# Patient Record
Sex: FEMALE | Race: WHITE | NOT HISPANIC OR LATINO
[De-identification: names, ages, dates, MRNs, and addresses within clinical notes are randomized per-mention and may not be internally consistent; named-entity substitution may affect disease eponyms.]

---

## 2017-10-29 ENCOUNTER — RESULT REVIEW (OUTPATIENT)
Age: 67
End: 2017-10-29

## 2019-01-03 PROBLEM — Z00.00 ENCOUNTER FOR PREVENTIVE HEALTH EXAMINATION: Status: ACTIVE | Noted: 2019-01-03

## 2019-01-11 ENCOUNTER — RECORD ABSTRACTING (OUTPATIENT)
Age: 69
End: 2019-01-11

## 2019-01-11 DIAGNOSIS — Z83.3 FAMILY HISTORY OF DIABETES MELLITUS: ICD-10-CM

## 2019-01-11 RX ORDER — ROSUVASTATIN CALCIUM 5 MG/1
5 TABLET, FILM COATED ORAL
Refills: 0 | Status: ACTIVE | COMMUNITY

## 2019-01-14 ENCOUNTER — APPOINTMENT (OUTPATIENT)
Dept: INTERNAL MEDICINE | Facility: CLINIC | Age: 69
End: 2019-01-14
Payer: MEDICARE

## 2019-01-14 VITALS
DIASTOLIC BLOOD PRESSURE: 85 MMHG | BODY MASS INDEX: 34.15 KG/M2 | SYSTOLIC BLOOD PRESSURE: 147 MMHG | WEIGHT: 200 LBS | HEART RATE: 83 BPM | HEIGHT: 64 IN

## 2019-01-14 VITALS — SYSTOLIC BLOOD PRESSURE: 128 MMHG | DIASTOLIC BLOOD PRESSURE: 78 MMHG

## 2019-01-14 PROCEDURE — 99214 OFFICE O/P EST MOD 30 MIN: CPT

## 2019-01-14 NOTE — HISTORY OF PRESENT ILLNESS
[FreeTextEntry1] : f/u  htn hyperlipidemia  she remains on losartan metformin crestor and recently invokana added  she  denies headaches  dizziness   and cp and sob  she was feeling  very tired before she saw endo and a1c was over  7  she is eating well  she walks  to work  for about 10 minutes at  times briskly

## 2019-01-14 NOTE — PHYSICAL EXAM
[No Acute Distress] : no acute distress [Well Nourished] : well nourished [Well Developed] : well developed [Well-Appearing] : well-appearing [Normal Sclera/Conjunctiva] : normal sclera/conjunctiva [Normal Outer Ear/Nose] : the outer ears and nose were normal in appearance [No JVD] : no jugular venous distention [No Respiratory Distress] : no respiratory distress  [Clear to Auscultation] : lungs were clear to auscultation bilaterally [No Accessory Muscle Use] : no accessory muscle use [Normal Percussion] : the chest was normal to percussion [Normal Rate] : normal rate  [Regular Rhythm] : with a regular rhythm [Normal S1, S2] : normal S1 and S2 [No Carotid Bruits] : no carotid bruits [Soft] : abdomen soft [Non Tender] : non-tender [Non-distended] : non-distended [No HSM] : no HSM [Normal Bowel Sounds] : normal bowel sounds [No Joint Swelling] : no joint swelling [Normal Gait] : normal gait [Coordination Grossly Intact] : coordination grossly intact [Speech Grossly Normal] : speech grossly normal [Memory Grossly Normal] : memory grossly normal [Normal Affect] : the affect was normal [Normal Mood] : the mood was normal

## 2019-01-24 ENCOUNTER — RESULT REVIEW (OUTPATIENT)
Age: 69
End: 2019-01-24

## 2019-06-10 ENCOUNTER — MEDICATION RENEWAL (OUTPATIENT)
Age: 69
End: 2019-06-10

## 2019-11-18 ENCOUNTER — APPOINTMENT (OUTPATIENT)
Dept: INTERNAL MEDICINE | Facility: CLINIC | Age: 69
End: 2019-11-18
Payer: MEDICARE

## 2019-11-18 ENCOUNTER — RECORD ABSTRACTING (OUTPATIENT)
Age: 69
End: 2019-11-18

## 2019-11-18 VITALS — SYSTOLIC BLOOD PRESSURE: 110 MMHG | DIASTOLIC BLOOD PRESSURE: 75 MMHG

## 2019-11-18 VITALS
BODY MASS INDEX: 32.1 KG/M2 | HEART RATE: 71 BPM | HEIGHT: 64 IN | SYSTOLIC BLOOD PRESSURE: 138 MMHG | DIASTOLIC BLOOD PRESSURE: 74 MMHG | WEIGHT: 188 LBS

## 2019-11-18 DIAGNOSIS — R07.9 CHEST PAIN, UNSPECIFIED: ICD-10-CM

## 2019-11-18 DIAGNOSIS — I10 ESSENTIAL (PRIMARY) HYPERTENSION: ICD-10-CM

## 2019-11-18 DIAGNOSIS — H61.23 IMPACTED CERUMEN, BILATERAL: ICD-10-CM

## 2019-11-18 PROCEDURE — 99214 OFFICE O/P EST MOD 30 MIN: CPT | Mod: 25

## 2019-11-18 PROCEDURE — 90732 PPSV23 VACC 2 YRS+ SUBQ/IM: CPT

## 2019-11-18 PROCEDURE — G0009: CPT

## 2019-11-18 PROCEDURE — 69210 REMOVE IMPACTED EAR WAX UNI: CPT

## 2019-11-18 RX ORDER — METFORMIN HYDROCHLORIDE 500 MG/1
500 TABLET, COATED ORAL
Qty: 180 | Refills: 0 | Status: ACTIVE | COMMUNITY
Start: 2019-11-18

## 2019-11-18 NOTE — PHYSICAL EXAM
[No Acute Distress] : no acute distress [Well Nourished] : well nourished [EOMI] : extraocular movements intact [Normal Sclera/Conjunctiva] : normal sclera/conjunctiva [Well Developed] : well developed [No Accessory Muscle Use] : no accessory muscle use [No Respiratory Distress] : no respiratory distress  [Normal Outer Ear/Nose] : the outer ears and nose were normal in appearance [Normal Rate] : normal rate  [Regular Rhythm] : with a regular rhythm [Normal S1, S2] : normal S1 and S2 [No Joint Swelling] : no joint swelling [Speech Grossly Normal] : speech grossly normal [Memory Grossly Normal] : memory grossly normal [Normal Affect] : the affect was normal [de-identified] : cerumin bilat [Normal Insight/Judgement] : insight and judgment were intact [Normal Mood] : the mood was normal

## 2019-11-18 NOTE — HISTORY OF PRESENT ILLNESS
[FreeTextEntry1] : for f/u of htn lipids  dm   she is exercising  she says her diet is good re dm and she is losing weight  she feels her ear  feels stuffed  she had a recent chest ache and she was in the [facility] er where a CTA was done to r/o pe  she has felt fine since also she says her ears are stuffed

## 2019-11-27 ENCOUNTER — APPOINTMENT (OUTPATIENT)
Dept: CARDIOLOGY | Facility: CLINIC | Age: 69
End: 2019-11-27
Payer: MEDICARE

## 2019-11-27 VITALS
HEIGHT: 64 IN | WEIGHT: 187 LBS | HEART RATE: 78 BPM | SYSTOLIC BLOOD PRESSURE: 138 MMHG | DIASTOLIC BLOOD PRESSURE: 75 MMHG | BODY MASS INDEX: 31.92 KG/M2 | RESPIRATION RATE: 16 BRPM | OXYGEN SATURATION: 97 %

## 2019-11-27 DIAGNOSIS — R94.31 ABNORMAL ELECTROCARDIOGRAM [ECG] [EKG]: ICD-10-CM

## 2019-11-27 PROCEDURE — 99214 OFFICE O/P EST MOD 30 MIN: CPT | Mod: 25

## 2019-11-27 PROCEDURE — 93015 CV STRESS TEST SUPVJ I&R: CPT

## 2019-12-18 ENCOUNTER — APPOINTMENT (OUTPATIENT)
Dept: CARDIOLOGY | Facility: CLINIC | Age: 69
End: 2019-12-18
Payer: MEDICARE

## 2019-12-18 PROCEDURE — 93015 CV STRESS TEST SUPVJ I&R: CPT

## 2019-12-18 PROCEDURE — 78452 HT MUSCLE IMAGE SPECT MULT: CPT

## 2019-12-18 PROCEDURE — A9500: CPT

## 2020-03-20 ENCOUNTER — APPOINTMENT (OUTPATIENT)
Dept: CARDIOLOGY | Facility: CLINIC | Age: 70
End: 2020-03-20
Payer: MEDICARE

## 2020-03-20 VITALS
WEIGHT: 187 LBS | OXYGEN SATURATION: 96 % | HEIGHT: 64 IN | DIASTOLIC BLOOD PRESSURE: 70 MMHG | BODY MASS INDEX: 31.92 KG/M2 | HEART RATE: 73 BPM | SYSTOLIC BLOOD PRESSURE: 135 MMHG

## 2020-03-20 PROCEDURE — 99214 OFFICE O/P EST MOD 30 MIN: CPT

## 2020-03-20 RX ORDER — METFORMIN HYDROCHLORIDE 500 MG/1
500 TABLET, COATED ORAL DAILY
Refills: 0 | Status: DISCONTINUED | COMMUNITY
End: 2020-03-20

## 2020-03-20 NOTE — REVIEW OF SYSTEMS
[Negative] : Heme/Lymph [Fever] : no fever [Shortness Of Breath] : no shortness of breath [Chest Pain] : no chest pain [Palpitations] : no palpitations [Cough] : no cough [Dysuria] : no dysuria

## 2020-03-20 NOTE — DISCUSSION/SUMMARY
[FreeTextEntry1] : 69F\par \par Negative ischemic testing. No further chest pain\par \par 1. HTN: Well controlled, continue losartan\par \par 2. HLD:  Lipids good, triglycerides elevated. Cont statin. Weight loss, watch carb intake\par \par 3. DM:  A1C 6.9%, managed by Dr. Henry\par \par Continued weight loss encouraged, diet, exercise \par \par RV 6 months\par Eventual echo

## 2020-03-20 NOTE — PHYSICAL EXAM
[General Appearance - Well Developed] : well developed [Normal Appearance] : normal appearance [Well Groomed] : well groomed [General Appearance - Well Nourished] : well nourished [No Deformities] : no deformities [General Appearance - In No Acute Distress] : no acute distress [Normal Conjunctiva] : the conjunctiva exhibited no abnormalities [Eyelids - No Xanthelasma] : the eyelids demonstrated no xanthelasmas [Normal Oral Mucosa] : normal oral mucosa [No Oral Pallor] : no oral pallor [No Oral Cyanosis] : no oral cyanosis [Normal Jugular Venous A Waves Present] : normal jugular venous A waves present [Normal Jugular Venous V Waves Present] : normal jugular venous V waves present [No Jugular Venous Espinoza A Waves] : no jugular venous espinoza A waves [Respiration, Rhythm And Depth] : normal respiratory rhythm and effort [Exaggerated Use Of Accessory Muscles For Inspiration] : no accessory muscle use [Auscultation Breath Sounds / Voice Sounds] : lungs were clear to auscultation bilaterally [Heart Rate And Rhythm] : heart rate and rhythm were normal [Heart Sounds] : normal S1 and S2 [Murmurs] : no murmurs present [Edema] : no peripheral edema present [Abdomen Soft] : soft [Abdomen Tenderness] : non-tender [Abdomen Mass (___ Cm)] : no abdominal mass palpated [Abnormal Walk] : normal gait [Gait - Sufficient For Exercise Testing] : the gait was sufficient for exercise testing [Nail Clubbing] : no clubbing of the fingernails [Cyanosis, Localized] : no localized cyanosis [Petechial Hemorrhages (___cm)] : no petechial hemorrhages [Skin Color & Pigmentation] : normal skin color and pigmentation [] : no rash [No Venous Stasis] : no venous stasis [Skin Lesions] : no skin lesions [No Skin Ulcers] : no skin ulcer [No Xanthoma] : no  xanthoma was observed [Oriented To Time, Place, And Person] : oriented to person, place, and time [Affect] : the affect was normal [Mood] : the mood was normal [No Anxiety] : not feeling anxious

## 2020-03-20 NOTE — HISTORY OF PRESENT ILLNESS
[FreeTextEntry1] : 69F with HTN, HLD, DM (6.9%) who presents for follow up\par \par L sided CP in November 2019, went to [facility], negative work up. Subsequent EST with increasing PVC's, NST within normal limits 12/2019. No pain since. Feels well. Patient otherwise denies shortness of breath, palpitations, dizziness, lightheadedness, syncope. \par \par Nonsmoker but secondhand smoke exposure. Father had CHF later in life (heavy smokers). Works in benefits. \par \par ECG: SR, no ST-T wave changes\par EST: 4:20, 1 mm ST depressions, increasing ectopy with exercise\par NST: 62%, no ischemia\par \par Losartan 100mg, Crestor 5mg, Metformin 500mg, Jardiance 25mg

## 2020-06-04 ENCOUNTER — APPOINTMENT (OUTPATIENT)
Dept: INTERNAL MEDICINE | Facility: CLINIC | Age: 70
End: 2020-06-04
Payer: MEDICARE

## 2020-06-04 VITALS
SYSTOLIC BLOOD PRESSURE: 159 MMHG | HEIGHT: 64 IN | HEART RATE: 63 BPM | DIASTOLIC BLOOD PRESSURE: 88 MMHG | TEMPERATURE: 97.7 F | BODY MASS INDEX: 32.1 KG/M2 | OXYGEN SATURATION: 100 % | WEIGHT: 188 LBS

## 2020-06-04 DIAGNOSIS — Z87.19 PERSONAL HISTORY OF OTHER DISEASES OF THE DIGESTIVE SYSTEM: ICD-10-CM

## 2020-06-04 PROCEDURE — 99204 OFFICE O/P NEW MOD 45 MIN: CPT

## 2020-06-04 NOTE — PHYSICAL EXAM
[General Appearance - In No Acute Distress] : in no acute distress [General Appearance - Alert] : alert [Sclera] : the sclera and conjunctiva were normal [PERRL With Normal Accommodation] : pupils were equal in size, round, and reactive to light [Extraocular Movements] : extraocular movements were intact [Outer Ear] : the ears and nose were normal in appearance [Oropharynx] : the oropharynx was normal [Neck Appearance] : the appearance of the neck was normal [Neck Cervical Mass (___cm)] : no neck mass was observed [Jugular Venous Distention Increased] : there was no jugular-venous distention [Thyroid Diffuse Enlargement] : the thyroid was not enlarged [Thyroid Nodule] : there were no palpable thyroid nodules [Auscultation Breath Sounds / Voice Sounds] : lungs were clear to auscultation bilaterally [Heart Rate And Rhythm] : heart rate was normal and rhythm regular [Heart Sounds] : normal S1 and S2 [Heart Sounds Gallop] : no gallops [Murmurs] : no murmurs [Heart Sounds Pericardial Friction Rub] : no pericardial rub [Full Pulse] : the pedal pulses are present [Edema] : there was no peripheral edema [Bowel Sounds] : normal bowel sounds [Abdomen Soft] : soft [Abdomen Tenderness] : non-tender [Abdomen Mass (___ Cm)] : no abdominal mass palpated [Cervical Lymph Nodes Enlarged Posterior Bilaterally] : posterior cervical [Cervical Lymph Nodes Enlarged Anterior Bilaterally] : anterior cervical [Supraclavicular Lymph Nodes Enlarged Bilaterally] : supraclavicular [Axillary Lymph Nodes Enlarged Bilaterally] : axillary [Femoral Lymph Nodes Enlarged Bilaterally] : femoral [Inguinal Lymph Nodes Enlarged Bilaterally] : inguinal [No CVA Tenderness] : no ~M costovertebral angle tenderness [Abnormal Walk] : normal gait [No Spinal Tenderness] : no spinal tenderness [Nail Clubbing] : no clubbing  or cyanosis of the fingernails [Motor Tone] : muscle strength and tone were normal [Musculoskeletal - Swelling] : no joint swelling seen [Skin Color & Pigmentation] : normal skin color and pigmentation [Skin Turgor] : normal skin turgor [] : no rash [Deep Tendon Reflexes (DTR)] : deep tendon reflexes were 2+ and symmetric [Sensation] : the sensory exam was normal to light touch and pinprick [No Focal Deficits] : no focal deficits [Oriented To Time, Place, And Person] : oriented to person, place, and time [Impaired Insight] : insight and judgment were intact [Affect] : the affect was normal

## 2020-06-04 NOTE — HISTORY OF PRESENT ILLNESS
[FreeTextEntry1] : H/o Gi problems - Constiaption , Fissure. Loose BM's urgency - possible due to metformin. Last colonoscopy 6 years ago. H/o polyps 10 years ago. GI. \par  h/o diabetes and High choletsrol. sees Dr. Goodwin. \par  had Stress testing recently OK

## 2020-07-03 DIAGNOSIS — Z01.818 ENCOUNTER FOR OTHER PREPROCEDURAL EXAMINATION: ICD-10-CM

## 2020-07-04 ENCOUNTER — APPOINTMENT (OUTPATIENT)
Dept: DISASTER EMERGENCY | Facility: CLINIC | Age: 70
End: 2020-07-04

## 2020-07-05 LAB — SARS-COV-2 N GENE NPH QL NAA+PROBE: NOT DETECTED

## 2020-07-06 ENCOUNTER — APPOINTMENT (OUTPATIENT)
Dept: INTERNAL MEDICINE | Facility: AMBULATORY MEDICAL SERVICES | Age: 70
End: 2020-07-06
Payer: MEDICARE

## 2020-07-06 DIAGNOSIS — D12.6 BENIGN NEOPLASM OF COLON, UNSPECIFIED: ICD-10-CM

## 2020-07-06 PROCEDURE — G0105: CPT

## 2022-02-03 ENCOUNTER — NON-APPOINTMENT (OUTPATIENT)
Age: 72
End: 2022-02-03

## 2022-02-03 ENCOUNTER — APPOINTMENT (OUTPATIENT)
Dept: INTERNAL MEDICINE | Facility: CLINIC | Age: 72
End: 2022-02-03
Payer: COMMERCIAL

## 2022-02-03 VITALS
OXYGEN SATURATION: 98 % | BODY MASS INDEX: 32.61 KG/M2 | HEART RATE: 69 BPM | WEIGHT: 191 LBS | HEIGHT: 64 IN | DIASTOLIC BLOOD PRESSURE: 82 MMHG | SYSTOLIC BLOOD PRESSURE: 143 MMHG | TEMPERATURE: 97.8 F

## 2022-02-03 VITALS — SYSTOLIC BLOOD PRESSURE: 138 MMHG | DIASTOLIC BLOOD PRESSURE: 80 MMHG | RESPIRATION RATE: 11 BRPM

## 2022-02-03 DIAGNOSIS — E66.9 OBESITY, UNSPECIFIED: ICD-10-CM

## 2022-02-03 DIAGNOSIS — Z23 ENCOUNTER FOR IMMUNIZATION: ICD-10-CM

## 2022-02-03 PROCEDURE — 36415 COLL VENOUS BLD VENIPUNCTURE: CPT

## 2022-02-03 PROCEDURE — 93000 ELECTROCARDIOGRAM COMPLETE: CPT | Mod: 59

## 2022-02-03 PROCEDURE — 99397 PER PM REEVAL EST PAT 65+ YR: CPT | Mod: 25

## 2022-02-03 PROCEDURE — G0444 DEPRESSION SCREEN ANNUAL: CPT | Mod: 59

## 2022-02-03 RX ORDER — SODIUM PICOSULFATE, MAGNESIUM OXIDE, AND ANHYDROUS CITRIC ACID 10; 3.5; 12 MG/160ML; G/160ML; G/160ML
10-3.5-12 MG-GM LIQUID ORAL
Qty: 1 | Refills: 0 | Status: DISCONTINUED | COMMUNITY
Start: 2020-06-04 | End: 2022-02-03

## 2022-02-03 RX ORDER — LOSARTAN POTASSIUM 100 MG/1
100 TABLET, FILM COATED ORAL DAILY
Qty: 90 | Refills: 3 | Status: DISCONTINUED | COMMUNITY
End: 2022-02-03

## 2022-02-03 RX ORDER — SODIUM SULFATE, POTASSIUM SULFATE, MAGNESIUM SULFATE 17.5; 3.13; 1.6 G/ML; G/ML; G/ML
17.5-3.13-1.6 SOLUTION, CONCENTRATE ORAL
Qty: 1 | Refills: 0 | Status: DISCONTINUED | COMMUNITY
Start: 2020-06-08 | End: 2022-02-03

## 2022-02-03 RX ORDER — PIOGLITAZONE HYDROCHLORIDE 15 MG/1
15 TABLET ORAL DAILY
Qty: 90 | Refills: 3 | Status: ACTIVE | COMMUNITY
Start: 2022-02-03

## 2022-02-03 RX ORDER — EMPAGLIFLOZIN 25 MG/1
25 TABLET, FILM COATED ORAL DAILY
Refills: 0 | Status: DISCONTINUED | COMMUNITY
Start: 2020-03-20 | End: 2022-02-03

## 2022-02-03 RX ORDER — EMPAGLIFLOZIN 10 MG/1
10 TABLET, FILM COATED ORAL
Refills: 0 | Status: ACTIVE | COMMUNITY
Start: 2022-02-03

## 2022-02-03 NOTE — HEALTH RISK ASSESSMENT
[Patient reported colonoscopy was normal] : Patient reported colonoscopy was normal [Good] : ~his/her~  mood as  good [Never] : Never [No] : No [No falls in past year] : Patient reported no falls in the past year [0] : 2) Feeling down, depressed, or hopeless: Not at all (0) [Patient reported mammogram was normal] : Patient reported mammogram was normal [Patient reported bone density results were normal] : Patient reported bone density results were normal [None] : None [Fully functional (bathing, dressing, toileting, transferring, walking, feeding)] : Fully functional (bathing, dressing, toileting, transferring, walking, feeding) [Fully functional (using the telephone, shopping, preparing meals, housekeeping, doing laundry, using] : Fully functional and needs no help or supervision to perform IADLs (using the telephone, shopping, preparing meals, housekeeping, doing laundry, using transportation, managing medications and managing finances) [Reports changes in hearing] : Reports changes in hearing [de-identified] : no real exercise [de-identified] : healthy [Change in mental status noted] : No change in mental status noted [Reports changes in vision] : Reports no changes in vision [Reports changes in dental health] : Reports no changes in dental health [BoneDensityDate] : 2020 [BoneDensityComments] : osteopenia [ColonoscopyDate] : 2020

## 2022-02-04 LAB
ALBUMIN SERPL ELPH-MCNC: 4.4 G/DL
ALP BLD-CCNC: 69 U/L
ALT SERPL-CCNC: 16 U/L
ANION GAP SERPL CALC-SCNC: 15 MMOL/L
AST SERPL-CCNC: 22 U/L
BASOPHILS # BLD AUTO: 0.04 K/UL
BASOPHILS NFR BLD AUTO: 0.5 %
BILIRUB SERPL-MCNC: 0.7 MG/DL
BUN SERPL-MCNC: 21 MG/DL
CALCIUM SERPL-MCNC: 9.7 MG/DL
CHLORIDE SERPL-SCNC: 103 MMOL/L
CHOLEST SERPL-MCNC: 165 MG/DL
CO2 SERPL-SCNC: 22 MMOL/L
CREAT SERPL-MCNC: 0.77 MG/DL
EOSINOPHIL # BLD AUTO: 0.11 K/UL
EOSINOPHIL NFR BLD AUTO: 1.3 %
GLUCOSE SERPL-MCNC: 130 MG/DL
HCT VFR BLD CALC: 38.8 %
HDLC SERPL-MCNC: 40 MG/DL
HGB BLD-MCNC: 12.7 G/DL
IMM GRANULOCYTES NFR BLD AUTO: 0.1 %
LDLC SERPL CALC-MCNC: 57 MG/DL
LYMPHOCYTES # BLD AUTO: 3.33 K/UL
LYMPHOCYTES NFR BLD AUTO: 39.6 %
MAN DIFF?: NORMAL
MCHC RBC-ENTMCNC: 30.7 PG
MCHC RBC-ENTMCNC: 32.7 GM/DL
MCV RBC AUTO: 93.7 FL
MONOCYTES # BLD AUTO: 0.55 K/UL
MONOCYTES NFR BLD AUTO: 6.5 %
NEUTROPHILS # BLD AUTO: 4.36 K/UL
NEUTROPHILS NFR BLD AUTO: 52 %
NONHDLC SERPL-MCNC: 125 MG/DL
PLATELET # BLD AUTO: 260 K/UL
POTASSIUM SERPL-SCNC: 4.5 MMOL/L
PROT SERPL-MCNC: 7.8 G/DL
RBC # BLD: 4.14 M/UL
RBC # FLD: 14.7 %
SODIUM SERPL-SCNC: 140 MMOL/L
TRIGL SERPL-MCNC: 342 MG/DL
WBC # FLD AUTO: 8.4 K/UL

## 2022-06-09 ENCOUNTER — APPOINTMENT (OUTPATIENT)
Dept: ORTHOPEDIC SURGERY | Facility: CLINIC | Age: 72
End: 2022-06-09
Payer: COMMERCIAL

## 2022-06-09 DIAGNOSIS — M43.16 SPONDYLOLISTHESIS, LUMBAR REGION: ICD-10-CM

## 2022-06-09 DIAGNOSIS — M51.36 OTHER INTERVERTEBRAL DISC DEGENERATION, LUMBAR REGION: ICD-10-CM

## 2022-06-09 PROCEDURE — 99213 OFFICE O/P EST LOW 20 MIN: CPT

## 2022-06-09 NOTE — HISTORY OF PRESENT ILLNESS
[Left Leg] : left leg [Right Leg] : right leg [Gradual] : gradual [5] : 5 [4] : 4 [Dull/Aching] : dull/aching [Localized] : localized [Radiating] : radiating [Intermittent] : intermittent [Rest] : rest [Physical therapy] : physical therapy [de-identified] : pt presents here today for a follow up on both hips \par pt was trying physical therapy in the past with some relief  [] : no [FreeTextEntry1] : hips [FreeTextEntry5] : no injury  [FreeTextEntry7] : right leg  [FreeTextEntry9] : celebrex [de-identified] : physical therapy

## 2022-06-09 NOTE — ASSESSMENT
[FreeTextEntry1] : We discussed case and treatment options. Pain is much better with occasional flares. WIll send Celebrex 200mg prn only with caution to use. Continue with HEP and stretching. Consider LESI if pain progresses. \par Follow up PRN

## 2022-06-09 NOTE — IMAGING
[de-identified] : \par Back, including spine: Inspection of the thoracic/lumbar spine is as follows: No atrophy, erythema and\par ecchymosis. Palpation of the thoracic/lumbar spine is as follows: no lumbar paraspinal spasm and no lumbar paraspinal\par tenderness. Range of motion of the thoracic and lumbar spine is as follows: stiffness at extremes of flexion,\par stiffness at extremes extensions, stiffness with bending to the right and stiffness with bending to the\par left. Strength Testing of the thoracic and lumbar spine is as follows: motor exam is 5/5 throughout both lower\par extremities with normal tone and motor exam is non-focal throughout both lower extremities Special testing of the\par thoracic and lumbar spine is as follows: negative straight leg raise on left side, negative straight leg raise on right side,\par negative sitting straight leg raise on right and negative sitting straight leg raise on left Neurological testing of the\par thoracic and lumbar spine is as follows: light touch is intact throughout both lower extremities and sensory exam is nonfocal throughout both lower extremities

## 2022-06-17 LAB — HBA1C MFR BLD HPLC: 6.6

## 2022-08-03 NOTE — COUNSELING
[de-identified] : diet  discussed re sugar and weight relation of weight to bp and sugar  explained and  importance of exercise for sugar wt and bp  and for cv risk reduction  general discussion of cv risk management especially dm  control  importance of bp control for renal preservation\par  well developed, well nourished , in no acute distress , ambulating without difficulty , normal communication ability

## 2022-11-17 ENCOUNTER — RESULT REVIEW (OUTPATIENT)
Age: 72
End: 2022-11-17

## 2023-01-12 ENCOUNTER — APPOINTMENT (OUTPATIENT)
Dept: ORTHOPEDIC SURGERY | Facility: CLINIC | Age: 73
End: 2023-01-12
Payer: MEDICARE

## 2023-01-12 VITALS — WEIGHT: 191 LBS | BODY MASS INDEX: 32.61 KG/M2 | HEIGHT: 64 IN

## 2023-01-12 DIAGNOSIS — M65.342 TRIGGER FINGER, LEFT RING FINGER: ICD-10-CM

## 2023-01-12 DIAGNOSIS — G56.01 CARPAL TUNNEL SYNDROME, RIGHT UPPER LIMB: ICD-10-CM

## 2023-01-12 PROCEDURE — L3908: CPT

## 2023-01-12 PROCEDURE — 99214 OFFICE O/P EST MOD 30 MIN: CPT

## 2023-01-12 PROCEDURE — 73130 X-RAY EXAM OF HAND: CPT | Mod: 50

## 2023-01-12 RX ORDER — DULAGLUTIDE 4.5 MG/.5ML
INJECTION, SOLUTION SUBCUTANEOUS
Refills: 0 | Status: ACTIVE | COMMUNITY

## 2023-01-12 NOTE — IMAGING
[de-identified] : left ring finger TTP a1 pulley\par no triggering\par otherwise farom\par NVID\par \par right hand no swelling or deformity\par no thenar atrophy\par full active range of motion\par decreased sensation to the median nerve\par intact ulnar and radial sensation\par ain/pin/ulnar motor intact\par negative tinels, phalens and durkans, negative spurling sign\par palpable pulses with CR<2s\par full motion to the elbow, shoulder\par \par

## 2023-01-12 NOTE — HISTORY OF PRESENT ILLNESS
[de-identified] : 1/12/23:  Pt has pain at the base of her left ring finger.  Pt has n/t in fingers of right hand.  The n/t only wakes her from sleep.

## 2023-02-03 ENCOUNTER — APPOINTMENT (OUTPATIENT)
Age: 73
End: 2023-02-03

## 2023-02-13 ENCOUNTER — APPOINTMENT (OUTPATIENT)
Dept: ORTHOPEDIC SURGERY | Facility: CLINIC | Age: 73
End: 2023-02-13

## 2023-08-17 ENCOUNTER — RX RENEWAL (OUTPATIENT)
Age: 73
End: 2023-08-17

## 2023-08-17 ENCOUNTER — APPOINTMENT (OUTPATIENT)
Dept: ORTHOPEDIC SURGERY | Facility: CLINIC | Age: 73
End: 2023-08-17
Payer: COMMERCIAL

## 2023-08-17 DIAGNOSIS — M54.42 LUMBAGO WITH SCIATICA, LEFT SIDE: ICD-10-CM

## 2023-08-17 DIAGNOSIS — E11.9 TYPE 2 DIABETES MELLITUS W/OUT COMPLICATIONS: ICD-10-CM

## 2023-08-17 DIAGNOSIS — I10 ESSENTIAL (PRIMARY) HYPERTENSION: ICD-10-CM

## 2023-08-17 PROCEDURE — 99214 OFFICE O/P EST MOD 30 MIN: CPT

## 2023-08-17 NOTE — ASSESSMENT
[FreeTextEntry1] : 73 year F WITH MODERATE B/L HIP PAIN AND LOW BACK PAIN. LUMBAR PAIN RADIATES DOWN THE BLE. PAIN WORSENS WITH PROLONGED STANDING AND SITTING TO STAND. PAIN IS AFFECTING FUNCTIONAL ACTIVITIES. TREATMENT OPTIONS REVIEWED. LUMBAR PT RX. IF SYMPTOMS PERSIST WILL ORDER LUMBAR MRI AND FOLLOW UP WITH PAIN MANAGEMENT. QUESTIONS ANSWERED. MOBIC RX.

## 2023-08-17 NOTE — HISTORY OF PRESENT ILLNESS
[Left Leg] : left leg [Right Leg] : right leg [Gradual] : gradual [5] : 5 [4] : 4 [Dull/Aching] : dull/aching [Localized] : localized [Radiating] : radiating [Intermittent] : intermittent [Rest] : rest [Physical therapy] : physical therapy [de-identified] : pt presents here today for a follow up on both hips  pt was trying physical therapy in the past with some relief   08/17/23 AMEE FLORES BL HIPS/BACK,  HAVING PAIN RADIATING DOWN THE LEFT LEG,PT FELL SATURDAY  [] : no [FreeTextEntry1] : hips [FreeTextEntry5] : no injury  [FreeTextEntry7] : right leg  [FreeTextEntry9] : celebrex [de-identified] : physical therapy

## 2024-01-10 ENCOUNTER — MED ADMIN CHARGE (OUTPATIENT)
Age: 74
End: 2024-01-10

## 2024-01-10 ENCOUNTER — APPOINTMENT (OUTPATIENT)
Dept: INTERNAL MEDICINE | Facility: CLINIC | Age: 74
End: 2024-01-10
Payer: COMMERCIAL

## 2024-01-10 PROCEDURE — G0008: CPT

## 2024-01-10 PROCEDURE — 90662 IIV NO PRSV INCREASED AG IM: CPT

## 2024-04-25 ENCOUNTER — NON-APPOINTMENT (OUTPATIENT)
Age: 74
End: 2024-04-25

## 2024-04-25 ENCOUNTER — APPOINTMENT (OUTPATIENT)
Dept: INTERNAL MEDICINE | Facility: CLINIC | Age: 74
End: 2024-04-25
Payer: MEDICARE

## 2024-04-25 VITALS
HEIGHT: 64 IN | HEART RATE: 70 BPM | WEIGHT: 191 LBS | OXYGEN SATURATION: 96 % | DIASTOLIC BLOOD PRESSURE: 86 MMHG | BODY MASS INDEX: 32.61 KG/M2 | SYSTOLIC BLOOD PRESSURE: 150 MMHG | TEMPERATURE: 98 F

## 2024-04-25 DIAGNOSIS — N32.81 OVERACTIVE BLADDER: ICD-10-CM

## 2024-04-25 DIAGNOSIS — Z00.00 ENCOUNTER FOR GENERAL ADULT MEDICAL EXAMINATION W/OUT ABNORMAL FINDINGS: ICD-10-CM

## 2024-04-25 DIAGNOSIS — R73.9 HYPERGLYCEMIA, UNSPECIFIED: ICD-10-CM

## 2024-04-25 DIAGNOSIS — E55.9 VITAMIN D DEFICIENCY, UNSPECIFIED: ICD-10-CM

## 2024-04-25 DIAGNOSIS — E11.9 TYPE 2 DIABETES MELLITUS W/OUT COMPLICATIONS: ICD-10-CM

## 2024-04-25 DIAGNOSIS — R49.0 DYSPHONIA: ICD-10-CM

## 2024-04-25 DIAGNOSIS — Z91.81 HISTORY OF FALLING: ICD-10-CM

## 2024-04-25 PROCEDURE — 93000 ELECTROCARDIOGRAM COMPLETE: CPT

## 2024-04-25 PROCEDURE — G0439: CPT

## 2024-04-25 RX ORDER — OLMESARTAN MEDOXOMIL 40 MG/1
40 TABLET, FILM COATED ORAL
Refills: 0 | Status: ACTIVE | COMMUNITY

## 2024-04-25 RX ORDER — CELECOXIB 200 MG/1
200 CAPSULE ORAL
Qty: 60 | Refills: 0 | Status: ACTIVE | COMMUNITY
Start: 2022-06-09 | End: 1900-01-01

## 2024-04-25 RX ORDER — CYCLOBENZAPRINE HYDROCHLORIDE 5 MG/1
5 TABLET, FILM COATED ORAL
Qty: 7 | Refills: 0 | Status: ACTIVE | COMMUNITY
Start: 2024-04-25 | End: 1900-01-01

## 2024-04-25 RX ORDER — SOLIFENACIN SUCCINATE 10 MG/1
10 TABLET ORAL
Refills: 0 | Status: ACTIVE | COMMUNITY

## 2024-04-25 RX ORDER — CARVEDILOL 3.12 MG/1
3.12 TABLET, FILM COATED ORAL
Qty: 180 | Refills: 1 | Status: ACTIVE | COMMUNITY
Start: 2024-04-25 | End: 1900-01-01

## 2024-04-25 RX ORDER — VALSARTAN 160 MG/1
160 TABLET, COATED ORAL DAILY
Qty: 90 | Refills: 3 | Status: DISCONTINUED | COMMUNITY
Start: 2022-02-03 | End: 2024-04-25

## 2024-04-25 RX ORDER — MELOXICAM 15 MG/1
15 TABLET ORAL
Qty: 30 | Refills: 1 | Status: DISCONTINUED | COMMUNITY
Start: 2023-08-17 | End: 2024-04-25

## 2024-04-25 NOTE — HEALTH RISK ASSESSMENT
[Good] : ~his/her~  mood as  good [0] : 2) Feeling down, depressed, or hopeless: Not at all (0) [Patient reported colonoscopy was normal] : Patient reported colonoscopy was normal [None] : None [Fully functional (bathing, dressing, toileting, transferring, walking, feeding)] : Fully functional (bathing, dressing, toileting, transferring, walking, feeding) [Fully functional (using the telephone, shopping, preparing meals, housekeeping, doing laundry, using] : Fully functional and needs no help or supervision to perform IADLs (using the telephone, shopping, preparing meals, housekeeping, doing laundry, using transportation, managing medications and managing finances) [Reports changes in hearing] : Reports changes in hearing [Yes] : Yes [Monthly or less (1 pt)] : Monthly or less (1 point) [1 or 2 (0 pts)] : 1 or 2 (0 points) [Never (0 pts)] : Never (0 points) [No] : In the past 12 months have you used drugs other than those required for medical reasons? No [One fall no injury in past year] : Patient reported one fall in the past year without injury [Little interest or pleasure doing things] : 1) Little interest or pleasure doing things [Feeling down, depressed, or hopeless] : 2) Feeling down, depressed, or hopeless [PHQ-2 Negative - No further assessment needed] : PHQ-2 Negative - No further assessment needed [No Retinopathy] : No retinopathy [Patient reported PAP Smear was normal] : Patient reported PAP Smear was normal [Patient reported bone density results were abnormal] : Patient reported bone density results were abnormal [HIV test declined] : HIV test declined [Hepatitis C test declined] : Hepatitis C test declined [With Family] : lives with family [Employed] : employed [] :  [# Of Children ___] : has [unfilled] children [Feels Safe at Home] : Feels safe at home [Smoke Detector] : smoke detector [Carbon Monoxide Detector] : carbon monoxide detector [Seat Belt] :  uses seat belt [Sunscreen] : uses sunscreen [Aggressive treatment] : aggressive treatment [Time Spent: ___ minutes] : Time Spent: [unfilled] minutes [Never] : Never [de-identified] : walking  [de-identified] : healthy [[id number]] : 0 [EyeExamDate] : 09/2023 [Change in mental status noted] : No change in mental status noted [Reports changes in vision] : Reports no changes in vision [Reports changes in dental health] : Reports no changes in dental health [MammogramDate] : 04/2024 [MammogramComments] : report is pending [PapSmearDate] : 04/2024 [BoneDensityDate] : 2023 [BoneDensityComments] : osteopenia [ColonoscopyDate] : 2020 [ColonoscopyComments] : repeat in 5 years [AdvancecareDate] : 04/25/2024

## 2024-04-25 NOTE — COUNSELING
[Fall prevention counseling provided] : Fall prevention counseling provided [Adequate lighting] : Adequate lighting [Behavioral health counseling provided] : Behavioral health counseling provided [Engage in a relaxing activity] : Engage in a relaxing activity [Potential consequences of obesity discussed] : Potential consequences of obesity discussed [Benefits of weight loss discussed] : Benefits of weight loss discussed [Weigh Self Weekly] : weigh self weekly [Decrease Portions] : decrease portions [Keep Food Diary] : keep food diary [Good understanding] : Patient has a good understanding of disease, goals and obesity follow-up plan

## 2024-04-25 NOTE — PHYSICAL EXAM
[de-identified] : hoarsness of the voice [TextEntry] : General: alert, awake, oriented  X 3, in no acute distress.  Eyes: PERRLA, EOM's  are intact b/l , conjunctiva clear,  Ears: Ear canal  is  clear b/l , no discharge. Tympanic membrane is   intact b/l Nose: Mucosa normal, no obstruction.  Throat: Clear, no exudates, no lesions.  Neck: Supple, no masses,  Chest: Lungs are  clear b/l , no rales, no rhonchi, no wheezes.  Breast exam- Within normal limits  b/l, no mass, no axillary lymphadenopathy Heart: RR, no murmurs, no rubs, no gallops.  Abdomen: Soft, no tenderness in all quadrant , no masses, BS normal.  Extremities: FROM, no deformities, no edema, no erythema.  Neuro: Physiological, no localizing findings.

## 2024-04-25 NOTE — HISTORY OF PRESENT ILLNESS
[FreeTextEntry1] : New patient to me Annual physical exam [de-identified] : Patient is 73 year female  with PMH of  DM type 2, Hyperlipidemia, HTN, came today for annual physical exam C/o hoarseness, losing voice more often Last seen by Endocrinologist 2 wks ago, lab work was done- report reviewed and d/w the patient S/p fall 2 wks ago on the street - c/o right leg pain due to overstretch

## 2024-04-25 NOTE — REVIEW OF SYSTEMS
[TextEntry] : Head: Denies headache, dizziness Eyes: No acute  vision problem  Ears: Denies hearing loss, tinnitus, no ear pain Nose: Denies nasal obstruction or any discharges C/o hoarseness Neck: Denies stiffness or muscle tenderness Chest: Denies cough, SOB CV: Denies chest pain, palpitation Abdominal: Denies abdominal pain, change in bowel movement Neurology: Denies  changes in mental status, seizure, no neurological deficit

## 2024-05-31 ENCOUNTER — APPOINTMENT (OUTPATIENT)
Dept: CARDIOLOGY | Facility: CLINIC | Age: 74
End: 2024-05-31
Payer: MEDICARE

## 2024-05-31 VITALS — SYSTOLIC BLOOD PRESSURE: 126 MMHG | DIASTOLIC BLOOD PRESSURE: 70 MMHG

## 2024-05-31 VITALS
WEIGHT: 190 LBS | HEART RATE: 63 BPM | DIASTOLIC BLOOD PRESSURE: 70 MMHG | SYSTOLIC BLOOD PRESSURE: 147 MMHG | OXYGEN SATURATION: 98 % | BODY MASS INDEX: 32.61 KG/M2

## 2024-05-31 DIAGNOSIS — E78.5 HYPERLIPIDEMIA, UNSPECIFIED: ICD-10-CM

## 2024-05-31 DIAGNOSIS — R01.1 CARDIAC MURMUR, UNSPECIFIED: ICD-10-CM

## 2024-05-31 DIAGNOSIS — I10 ESSENTIAL (PRIMARY) HYPERTENSION: ICD-10-CM

## 2024-05-31 PROCEDURE — 99204 OFFICE O/P NEW MOD 45 MIN: CPT

## 2024-05-31 PROCEDURE — G2211 COMPLEX E/M VISIT ADD ON: CPT

## 2024-05-31 NOTE — HISTORY OF PRESENT ILLNESS
[FreeTextEntry1] : 73F with HTN, HLD, DM (6.9%) who presents for follow up  Last seen March 2020 Feeling very well overall Abnormal EST in past which led to normal NST In the process of a move which is stressful for the patient Patient denies chest pain, shortness of breath, palpitations, dizziness, lightheadedness, syncope. Does light exercise at home, walks to work (10 minutes)   HISTORY:  L sided CP in November 2019, went to [facility], negative work up. Subsequent EST with increasing PVC's, NST within normal limits 12/2019. No pain since.  Nonsmoker but secondhand smoke exposure. Father had CHF later in life (heavy smoker). Works in benefits.   ECG: SR, no ST-T wave changes EST: 4:20, 1 mm ST depressions, increasing ectopy with exercise NST 12/2019: 62%, no ischemia  Crestor 5mg  Olmesartan 40mg Carvedilol 3.125mg once daily   Metformin 500mg Pioglitazone  Brenzavvy (Bexagliflozin-SGLT-2 inhibitor)

## 2024-05-31 NOTE — DISCUSSION/SUMMARY
[FreeTextEntry1] : 73F  Murmur on exam: No echo on file, will order  HTN: Better on recheck, continue Olmesartan, takes carvedilol once daily   HLD:  Lipids good, triglycerides elevated to 223. Cont statin. Weight loss, watch carb intake  DM:  A1C 6.3%, managed by Dr. Henry  Continued weight loss encouraged, diet, exercise   RV 6M

## 2024-07-25 ENCOUNTER — APPOINTMENT (OUTPATIENT)
Dept: INTERNAL MEDICINE | Facility: CLINIC | Age: 74
End: 2024-07-25
Payer: MEDICARE

## 2024-07-25 PROCEDURE — 93306 TTE W/DOPPLER COMPLETE: CPT

## 2024-09-27 LAB — HBA1C MFR BLD HPLC: 6.3
